# Patient Record
Sex: FEMALE | Race: WHITE | NOT HISPANIC OR LATINO | Employment: UNEMPLOYED | URBAN - NONMETROPOLITAN AREA
[De-identification: names, ages, dates, MRNs, and addresses within clinical notes are randomized per-mention and may not be internally consistent; named-entity substitution may affect disease eponyms.]

---

## 2023-10-04 ENCOUNTER — OFFICE VISIT (OUTPATIENT)
Dept: BARIATRICS/WEIGHT MGMT | Facility: CLINIC | Age: 23
End: 2023-10-04
Payer: COMMERCIAL

## 2023-10-04 VITALS
HEIGHT: 70 IN | HEART RATE: 86 BPM | SYSTOLIC BLOOD PRESSURE: 127 MMHG | TEMPERATURE: 98.4 F | BODY MASS INDEX: 41.95 KG/M2 | OXYGEN SATURATION: 97 % | WEIGHT: 293 LBS | DIASTOLIC BLOOD PRESSURE: 81 MMHG

## 2023-10-04 DIAGNOSIS — E66.01 CLASS 3 SEVERE OBESITY DUE TO EXCESS CALORIES WITH SERIOUS COMORBIDITY AND BODY MASS INDEX (BMI) OF 45.0 TO 49.9 IN ADULT: Primary | ICD-10-CM

## 2023-10-04 DIAGNOSIS — D17.24 LIPOMA OF BOTH LOWER EXTREMITIES: ICD-10-CM

## 2023-10-04 DIAGNOSIS — D17.23 LIPOMA OF BOTH LOWER EXTREMITIES: ICD-10-CM

## 2023-10-04 PROCEDURE — 99204 OFFICE O/P NEW MOD 45 MIN: CPT | Performed by: SURGERY

## 2023-10-04 RX ORDER — MUPIROCIN CALCIUM 20 MG/G
1 CREAM TOPICAL 3 TIMES DAILY
COMMUNITY

## 2023-10-04 RX ORDER — ALBUTEROL SULFATE 90 UG/1
2 AEROSOL, METERED RESPIRATORY (INHALATION) EVERY 4 HOURS PRN
COMMUNITY

## 2023-10-04 RX ORDER — POTASSIUM CHLORIDE 600 MG/1
8 TABLET, FILM COATED, EXTENDED RELEASE ORAL 2 TIMES DAILY
COMMUNITY

## 2023-10-04 RX ORDER — ESTRADIOL 0.1 MG/D
1 FILM, EXTENDED RELEASE TRANSDERMAL 2 TIMES WEEKLY
COMMUNITY

## 2023-10-04 RX ORDER — IBUPROFEN 800 MG/1
800 TABLET ORAL EVERY 6 HOURS PRN
COMMUNITY

## 2023-10-04 RX ORDER — ZOLPIDEM TARTRATE 6.25 MG/1
6.25 TABLET, FILM COATED, EXTENDED RELEASE ORAL NIGHTLY PRN
COMMUNITY

## 2023-10-04 RX ORDER — MELATONIN 10 MG
CAPSULE ORAL
COMMUNITY

## 2023-10-04 RX ORDER — HYDROCORTISONE 10 MG/1
10 TABLET ORAL DAILY
COMMUNITY

## 2023-10-04 RX ORDER — FLUTICASONE PROPIONATE 50 MCG
2 SPRAY, SUSPENSION (ML) NASAL DAILY
COMMUNITY

## 2023-10-04 RX ORDER — LEVOTHYROXINE SODIUM 0.2 MG/1
200 TABLET ORAL DAILY
COMMUNITY

## 2023-10-04 RX ORDER — TIZANIDINE 4 MG/1
4 TABLET ORAL NIGHTLY PRN
COMMUNITY

## 2023-10-04 RX ORDER — MEDROXYPROGESTERONE ACETATE 5 MG/1
5 TABLET ORAL DAILY
COMMUNITY

## 2023-10-04 RX ORDER — ZOLPIDEM TARTRATE 5 MG/1
5 TABLET ORAL NIGHTLY PRN
COMMUNITY

## 2023-10-04 RX ORDER — DESMOPRESSIN ACETATE 0.1 MG/ML
1 SOLUTION NASAL 2 TIMES DAILY
COMMUNITY

## 2023-10-04 RX ORDER — PHENOL 1.4 %
600 AEROSOL, SPRAY (ML) MUCOUS MEMBRANE DAILY
COMMUNITY

## 2023-10-04 RX ORDER — LIDOCAINE AND PRILOCAINE 25; 25 MG/G; MG/G
1 CREAM TOPICAL
COMMUNITY

## 2023-10-04 RX ORDER — SERTRALINE HYDROCHLORIDE 100 MG/1
100 TABLET, FILM COATED ORAL DAILY
COMMUNITY

## 2023-10-04 RX ORDER — MULTIPLE VITAMINS W/ MINERALS TAB 9MG-400MCG
1 TAB ORAL DAILY
COMMUNITY

## 2023-10-04 NOTE — PROGRESS NOTES
General Surgery History and Physical     Referring Provider: No ref. provider found    Patient Care Team:  Sheri Patrick MD as PCP - General (Internal Medicine)  Richard Bridges MD as Surgeon (General Surgery)    Chief complaint: Bilateral lower extremity lipomas    Subjective      History of present illness:  The patient is a 22 y.o. female with a history since 2021 of bilateral lower extremity lipomas or slight soft tissue masses.  She stated she noticed them increased in size this past year.  She is suffering from the disease of morbid obesity and is currently being provided a GLP-1 for the treatment of this.  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  She has lost over 60 pounds in the past year.  She notes that the soft tissue masses on her thighs in particular/lower extremity are tender when laying on them and would like an opinion on treatment options.    Review of Systems    Review of Systems - General ROS: negative  ENT ROS: negative  Respiratory ROS: no cough, shortness of breath, or wheezing  Cardiovascular ROS: no chest pain or dyspnea on exertion  Gastrointestinal ROS: no abdominal pain, change in bowel habits, or black or bloody stools  Genito-Urinary ROS: positive for - irregular/heavy menses  Psychological ROS: positive for - anxiety  Endocrine ROS: positive for - hair pattern changes and unexpected weight changes    History  History reviewed. No pertinent past medical history., History reviewed. No pertinent surgical history., History reviewed. No pertinent family history.,  , (Not in a hospital admission)   and Allergies:  Tylenol [acetaminophen], Adhesive tape, and Phenergan [promethazine]    Current Outpatient Medications:     albuterol sulfate  (90 Base) MCG/ACT inhaler, Inhale 2 puffs Every 4 (Four) Hours As Needed for Wheezing., Disp: , Rfl:     calcium carbonate (OS-KAROLINA) 600 MG tablet, Take 1 tablet by mouth Daily., Disp: , Rfl:     Cranberry 500 MG tablet,  Take  by mouth., Disp: , Rfl:     desmopressin (DDAVP NASAL) 0.01 % solution, 1 spray into the nostril(s) as directed by provider 2 (Two) Times a Day., Disp: , Rfl:     estradiol (VIVELLE-DOT) 0.1 MG/24HR patch, Place 1 patch on the skin as directed by provider 2 (Two) Times a Week., Disp: , Rfl:     Fexofenadine HCl (ALLEGRA ALLERGY PO), Take  by mouth., Disp: , Rfl:     Fexofenadine HCl (MUCINEX ALLERGY PO), Take  by mouth., Disp: , Rfl:     fluticasone (FLONASE) 50 MCG/ACT nasal spray, 2 sprays into the nostril(s) as directed by provider Daily., Disp: , Rfl:     hydrocortisone (CORTEF) 10 MG tablet, Take 1 tablet by mouth Daily., Disp: , Rfl:     ibuprofen (ADVIL,MOTRIN) 800 MG tablet, Take 1 tablet by mouth Every 6 (Six) Hours As Needed for Mild Pain., Disp: , Rfl:     levothyroxine (SYNTHROID, LEVOTHROID) 200 MCG tablet, Take 1 tablet by mouth Daily., Disp: , Rfl:     lidocaine-prilocaine (EMLA) 2.5-2.5 % cream, Apply 1 application  topically to the appropriate area as directed Every 2 (Two) Hours As Needed for Mild Pain., Disp: , Rfl:     MAGNESIUM-OXIDE PO, Take  by mouth., Disp: , Rfl:     medroxyPROGESTERone (PROVERA) 5 MG tablet, Take 1 tablet by mouth Daily., Disp: , Rfl:     Melatonin 10 MG capsule, Take  by mouth., Disp: , Rfl:     multivitamin with minerals (MULTIVITAMIN ADULT PO), Take 1 tablet by mouth Daily., Disp: , Rfl:     mupirocin (BACTROBAN) 2 % cream, Apply 1 application  topically to the appropriate area as directed 3 (Three) Times a Day., Disp: , Rfl:     Oxymetazoline HCl (AFRIN 12 HOUR NA), into the nostril(s) as directed by provider., Disp: , Rfl:     potassium chloride (KLOR-CON) 8 MEQ CR tablet, Take 1 tablet by mouth 2 (Two) Times a Day., Disp: , Rfl:     Semaglutide (OZEMPIC, 1 MG/DOSE, SC), Inject  under the skin into the appropriate area as directed., Disp: , Rfl:     sertraline (ZOLOFT) 100 MG tablet, Take 1 tablet by mouth Daily., Disp: , Rfl:     Somatropin (HUMATROPE IJ),  Inject  as directed., Disp: , Rfl:     tiZANidine (ZANAFLEX) 4 MG tablet, Take 1 tablet by mouth At Night As Needed for Muscle Spasms., Disp: , Rfl:     ubrogepant (Ubrelvy) 100 MG tablet, Take  by mouth., Disp: , Rfl:     zolpidem (AMBIEN) 5 MG tablet, Take 1 tablet by mouth At Night As Needed for Sleep., Disp: , Rfl:     zolpidem CR (AMBIEN CR) 6.25 MG CR tablet, Take 1 tablet by mouth At Night As Needed for Sleep., Disp: , Rfl:     Objective     Vital Signs   Temp:  [98.4 °F (36.9 °C)] 98.4 °F (36.9 °C)  Heart Rate:  [86] 86  BP: (127)/(81) 127/81    Physical Exam:  General appearance - alert, well appearing, and in no distress  Chest - clear to auscultation, no wheezes, rales or rhonchi, symmetric air entry  Heart - normal rate and regular rhythm  Abdomen - soft, nontender, nondistended, no masses or organomegaly  Extremities -bilateral soft tissue masses sizes greater than 5 cm.  Soft nontender with exam.    Results Review:     Lab Results (last 24 hours)       ** No results found for the last 24 hours. **          Imaging Results (Last 24 Hours)       ** No results found for the last 24 hours. **              Assessment & Plan       1.  Bilateral soft tissue masses possibly lipomas    2.  Morbid obesity-BMI 48.3    Plan: With respect to her bilateral lipomas I have recommended that we continue to treat her morbid obesity first.  It appears that her weight loss has defined the soft tissue masses more prominently.  She will start a program for weight management and treatment of her morbid obesity.  We will continue to observe the lipomas.  In 3 months time after being treated and having improvement of her overall health we will address the lipomas surgically.  I explained that there is an increased risk of wound infection and wound complications due to her morbid obesity and wound healing.  Therefore we will optimize continued health and nutrition with the patient prior to excising these lipomas.  The patient  and her mother were both in agreement of this plan and we will proceed accordingly.      Richard Bridges MD  10/04/23  11:53 CDT

## 2023-10-09 ENCOUNTER — TELEPHONE (OUTPATIENT)
Dept: BARIATRICS/WEIGHT MGMT | Facility: CLINIC | Age: 23
End: 2023-10-09
Payer: COMMERCIAL

## 2023-10-09 NOTE — TELEPHONE ENCOUNTER
Appointment cancelled        ----- Message from Nancy Baptiste sent at 10/5/2023  8:36 AM CDT -----  Regarding: Appointment Cancellation Request  Contact: [phone]  Nancy Baptiste would like to cancel the following appointments:    MARIA EUGENIA Lopez in [facility] [place] [facility] ([phone]), 10/10/2023  9:00 AM    Comments:  Only saw what he, Dr. Bridges, was concerned with-morbid obesity. Asked one question about medical issues without considering all medical concerns. Only looked at two out of five cysts that cause daily pain and discomfort. There is more to me than my size.  He was the only person in that office that made me feel that way.  
Exercise/Nothing per vagina